# Patient Record
Sex: MALE | Race: WHITE | NOT HISPANIC OR LATINO | URBAN - METROPOLITAN AREA
[De-identification: names, ages, dates, MRNs, and addresses within clinical notes are randomized per-mention and may not be internally consistent; named-entity substitution may affect disease eponyms.]

---

## 2019-01-17 ENCOUNTER — IMPORTED ENCOUNTER (OUTPATIENT)
Dept: URBAN - METROPOLITAN AREA CLINIC 9 | Facility: CLINIC | Age: 79
End: 2019-01-17

## 2019-01-31 ENCOUNTER — IMPORTED ENCOUNTER (OUTPATIENT)
Dept: URBAN - METROPOLITAN AREA CLINIC 9 | Facility: CLINIC | Age: 79
End: 2019-01-31

## 2019-02-01 ENCOUNTER — IMPORTED ENCOUNTER (OUTPATIENT)
Dept: URBAN - METROPOLITAN AREA CLINIC 9 | Facility: CLINIC | Age: 79
End: 2019-02-01

## 2019-04-04 ENCOUNTER — IMPORTED ENCOUNTER (OUTPATIENT)
Dept: URBAN - METROPOLITAN AREA CLINIC 9 | Facility: CLINIC | Age: 79
End: 2019-04-04

## 2019-10-07 NOTE — PATIENT DISCUSSION
VITREOMACULAR TRACTION, OD:  TX OPTIONS DISCUSSED. PATIENT ELECTS TO CONTINUE TO OBSERVE. RETURN AS SCHEDULED.

## 2019-10-07 NOTE — PATIENT DISCUSSION
HTN RETINOPATHY: PARAPAPILLARY HEMORRHAGE AND A/V NICKING; EMPHASIZED IMPORTANCE OF GOOD BLOOD PRESSURE CONTROL TO MINIMIZE RISK OF VASCULAR COMPLICATIONS IN THE EYE.

## 2019-10-17 ENCOUNTER — IMPORTED ENCOUNTER (OUTPATIENT)
Dept: URBAN - METROPOLITAN AREA CLINIC 9 | Facility: CLINIC | Age: 79
End: 2019-10-17

## 2019-11-19 ENCOUNTER — IMPORTED ENCOUNTER (OUTPATIENT)
Dept: URBAN - METROPOLITAN AREA CLINIC 9 | Facility: CLINIC | Age: 79
End: 2019-11-19

## 2020-02-05 ENCOUNTER — IMPORTED ENCOUNTER (OUTPATIENT)
Dept: URBAN - METROPOLITAN AREA CLINIC 9 | Facility: CLINIC | Age: 80
End: 2020-02-05

## 2020-03-03 ENCOUNTER — IMPORTED ENCOUNTER (OUTPATIENT)
Dept: URBAN - METROPOLITAN AREA CLINIC 9 | Facility: CLINIC | Age: 80
End: 2020-03-03

## 2020-09-09 ENCOUNTER — IMPORTED ENCOUNTER (OUTPATIENT)
Dept: URBAN - METROPOLITAN AREA CLINIC 9 | Facility: CLINIC | Age: 80
End: 2020-09-09

## 2020-09-21 ENCOUNTER — IMPORTED ENCOUNTER (OUTPATIENT)
Dept: URBAN - METROPOLITAN AREA CLINIC 9 | Facility: CLINIC | Age: 80
End: 2020-09-21

## 2020-09-23 ENCOUNTER — IMPORTED ENCOUNTER (OUTPATIENT)
Dept: URBAN - METROPOLITAN AREA CLINIC 9 | Facility: CLINIC | Age: 80
End: 2020-09-23

## 2020-10-01 ENCOUNTER — IMPORTED ENCOUNTER (OUTPATIENT)
Dept: URBAN - METROPOLITAN AREA CLINIC 9 | Facility: CLINIC | Age: 80
End: 2020-10-01

## 2021-01-05 ENCOUNTER — IMPORTED ENCOUNTER (OUTPATIENT)
Dept: URBAN - METROPOLITAN AREA CLINIC 9 | Facility: CLINIC | Age: 81
End: 2021-01-05

## 2021-02-19 NOTE — PATIENT DISCUSSION
Recommend warm compresses and lid scrubs to help manage blepharitis. Use artificial tears 4-6 times a day for dry eyes.

## 2021-03-10 ENCOUNTER — IMPORTED ENCOUNTER (OUTPATIENT)
Dept: URBAN - METROPOLITAN AREA CLINIC 9 | Facility: CLINIC | Age: 81
End: 2021-03-10

## 2021-05-10 ENCOUNTER — IMPORTED ENCOUNTER (OUTPATIENT)
Dept: URBAN - METROPOLITAN AREA CLINIC 9 | Facility: CLINIC | Age: 81
End: 2021-05-10

## 2021-09-14 ENCOUNTER — IMPORTED ENCOUNTER (OUTPATIENT)
Dept: URBAN - METROPOLITAN AREA CLINIC 9 | Facility: CLINIC | Age: 81
End: 2021-09-14

## 2021-10-18 ASSESSMENT — VISUAL ACUITY
OD_CC: 20/25 +2 SN
OD_CC: 20/25 +2 SN
OD_SC: 20/40 -2 SN
OS_SC: 20/25 -2 SN
OS_CC: 20/20 -2 SN
OS_CC: 20/20 -2 SN
OS_SC: 20/30 -2 SN
OS_SC: 20/40 SN
OD_SC: 20/30 SN
OS_SC: 20/40 + SN
OD_SC: 20/25 -2 SN
OS_SC: 20/30 - SN
OD_SC: 20/40 -2 SN
OD_SC: 20/25 - SN
OD_SC: 20/25 SN
OS_PH: 20/40 + SN
OS_SC: 20/40 SN
OS_SC: 20/30 -2 SN
OS_SC: 20/40 SN
OS_SC: 20/50 + SN
OS_CC: 20/25 -2 SN
OD_SC: 20/25 - SN
OD_CC: 20/20 - SN
OD_SC: 20/40 + SN
OD_SC: 20/25 -2 SN
OD_SC: 20/40 -2 SN

## 2021-10-18 ASSESSMENT — TONOMETRY
OD_IOP_MMHG: 21
OS_IOP_MMHG: 18
OD_IOP_MMHG: 17
OD_IOP_MMHG: 18
OS_IOP_MMHG: 18
OD_IOP_MMHG: 18
OD_IOP_MMHG: 20
OD_IOP_MMHG: 20
OD_IOP_MMHG: 24
OD_IOP_MMHG: 14
OS_IOP_MMHG: 20
OD_IOP_MMHG: 20
OS_IOP_MMHG: 21
OS_IOP_MMHG: 19
OS_IOP_MMHG: 20
OD_IOP_MMHG: 21
OD_IOP_MMHG: 19
OS_IOP_MMHG: 20
OD_IOP_MMHG: 19
OS_IOP_MMHG: 16
OS_IOP_MMHG: 17
OS_IOP_MMHG: 20
OD_IOP_MMHG: 19
OD_IOP_MMHG: 20
OS_IOP_MMHG: 20
OS_IOP_MMHG: 21
OD_IOP_MMHG: 20
OS_IOP_MMHG: 14
OD_IOP_MMHG: 22
OS_IOP_MMHG: 19

## 2021-10-18 ASSESSMENT — KERATOMETRY
OS_K1POWER_DIOPTERS: 44.75
OS_AXISANGLE_DEGREES: 76
OS_AXISANGLE_DEGREES: 88
OS_AXISANGLE2_DEGREES: 178
OD_AXISANGLE2_DEGREES: 25
OD_AXISANGLE_DEGREES: 115
OS_AXISANGLE2_DEGREES: 166
OD_K1POWER_DIOPTERS: 44.5
OS_K1POWER_DIOPTERS: 44.625
OD_AXISANGLE2_DEGREES: 174
OD_K2POWER_DIOPTERS: 45
OD_K2POWER_DIOPTERS: 45.25
OS_K2POWER_DIOPTERS: 45.25
OD_AXISANGLE_DEGREES: 84
OD_K1POWER_DIOPTERS: 44.25
OS_K2POWER_DIOPTERS: 45.25

## 2022-01-18 ENCOUNTER — FOLLOW UP (OUTPATIENT)
Dept: URBAN - NONMETROPOLITAN AREA CLINIC 6 | Facility: CLINIC | Age: 82
End: 2022-01-18

## 2022-01-18 DIAGNOSIS — H40.1122: ICD-10-CM

## 2022-01-18 DIAGNOSIS — H40.1112: ICD-10-CM

## 2022-01-18 PROCEDURE — 99213 OFFICE O/P EST LOW 20 MIN: CPT

## 2022-01-18 ASSESSMENT — KERATOMETRY
OS_AXISANGLE_DEGREES: 76
OD_AXISANGLE2_DEGREES: 25
OS_K1POWER_DIOPTERS: 44.75
OS_AXISANGLE2_DEGREES: 166
OD_K2POWER_DIOPTERS: 45.25
OD_AXISANGLE_DEGREES: 115
OD_K1POWER_DIOPTERS: 44.25
OS_K2POWER_DIOPTERS: 45.25

## 2022-01-18 ASSESSMENT — TONOMETRY
OD_IOP_MMHG: 21
OS_IOP_MMHG: 22

## 2022-01-18 ASSESSMENT — VISUAL ACUITY
OD_SC: 20/30-1
OU_SC: 20/40
OS_SC: 20/40-1

## 2022-03-02 ENCOUNTER — DIAGNOSTICS ONLY (OUTPATIENT)
Dept: URBAN - NONMETROPOLITAN AREA CLINIC 6 | Facility: CLINIC | Age: 82
End: 2022-03-02

## 2022-03-02 DIAGNOSIS — H40.1112: ICD-10-CM

## 2022-03-02 DIAGNOSIS — H40.1122: ICD-10-CM

## 2022-03-02 PROCEDURE — 92083 EXTENDED VISUAL FIELD XM: CPT

## 2022-03-02 PROCEDURE — 92250 FUNDUS PHOTOGRAPHY W/I&R: CPT

## 2022-03-02 ASSESSMENT — KERATOMETRY
OD_AXISANGLE2_DEGREES: 25
OS_AXISANGLE_DEGREES: 76
OS_K2POWER_DIOPTERS: 45.25
OS_K1POWER_DIOPTERS: 44.75
OS_AXISANGLE2_DEGREES: 166
OD_AXISANGLE_DEGREES: 115
OD_K1POWER_DIOPTERS: 44.25
OD_K2POWER_DIOPTERS: 45.25

## 2022-05-18 ENCOUNTER — FOLLOW UP (OUTPATIENT)
Dept: URBAN - NONMETROPOLITAN AREA CLINIC 6 | Facility: CLINIC | Age: 82
End: 2022-05-18

## 2022-05-18 DIAGNOSIS — H04.123: ICD-10-CM

## 2022-05-18 DIAGNOSIS — H40.1132: ICD-10-CM

## 2022-05-18 DIAGNOSIS — Z96.1: ICD-10-CM

## 2022-05-18 PROCEDURE — 99213 OFFICE O/P EST LOW 20 MIN: CPT

## 2022-05-18 ASSESSMENT — VISUAL ACUITY
OD_SC: 20/40
OS_SC: 20/40

## 2022-05-18 ASSESSMENT — TONOMETRY
OS_IOP_MMHG: 18
OD_IOP_MMHG: 18

## 2022-09-13 ENCOUNTER — COMPREHENSIVE EXAM (OUTPATIENT)
Dept: URBAN - NONMETROPOLITAN AREA CLINIC 6 | Facility: CLINIC | Age: 82
End: 2022-09-13

## 2022-09-13 DIAGNOSIS — H40.1132: ICD-10-CM

## 2022-09-13 DIAGNOSIS — H04.123: ICD-10-CM

## 2022-09-13 DIAGNOSIS — H26.493: ICD-10-CM

## 2022-09-13 PROCEDURE — 92014 COMPRE OPH EXAM EST PT 1/>: CPT

## 2022-09-13 PROCEDURE — 92133 CPTRZD OPH DX IMG PST SGM ON: CPT

## 2022-09-13 RX ORDER — BIMATOPROST 0.1 MG/ML: 1 SOLUTION/ DROPS OPHTHALMIC EVERY EVENING

## 2022-09-13 ASSESSMENT — KERATOMETRY
OS_K2POWER_DIOPTERS: 45.25
OS_AXISANGLE_DEGREES: 83
OS_K1POWER_DIOPTERS: 44.50
OD_K2POWER_DIOPTERS: 45.50
OS_AXISANGLE2_DEGREES: 173
OD_AXISANGLE2_DEGREES: 17
OD_K1POWER_DIOPTERS: 44.25
OD_AXISANGLE_DEGREES: 107

## 2022-09-13 ASSESSMENT — VISUAL ACUITY
OU_CC: J1
OU_CC: 20/25
OD_GLARE: 20/50
OU_SC: 20/30
OS_CC: 20/30
OS_SC: 20/30-1
OD_CC: 20/25-2
OD_SC: 20/50-1
OS_GLARE: 20/40

## 2022-09-13 ASSESSMENT — TONOMETRY
OD_IOP_MMHG: 22
OS_IOP_MMHG: 21

## 2022-09-29 ENCOUNTER — FOLLOW UP (OUTPATIENT)
Dept: URBAN - NONMETROPOLITAN AREA CLINIC 6 | Facility: CLINIC | Age: 82
End: 2022-09-29

## 2022-09-29 DIAGNOSIS — H40.1132: ICD-10-CM

## 2022-09-29 DIAGNOSIS — H04.123: ICD-10-CM

## 2022-09-29 PROCEDURE — 99213 OFFICE O/P EST LOW 20 MIN: CPT

## 2022-09-29 ASSESSMENT — VISUAL ACUITY
OD_CC: 20/40
OS_CC: 20/30+1

## 2022-09-29 ASSESSMENT — TONOMETRY
OD_IOP_MMHG: 17
OS_IOP_MMHG: 17

## 2022-10-25 ENCOUNTER — FOLLOW UP (OUTPATIENT)
Dept: URBAN - NONMETROPOLITAN AREA CLINIC 6 | Facility: CLINIC | Age: 82
End: 2022-10-25

## 2022-10-25 DIAGNOSIS — H04.123: ICD-10-CM

## 2022-10-25 DIAGNOSIS — H40.1132: ICD-10-CM

## 2022-10-25 PROCEDURE — 99213 OFFICE O/P EST LOW 20 MIN: CPT

## 2022-10-25 ASSESSMENT — VISUAL ACUITY
OS_SC: 20/30
OU_SC: 20/30
OD_SC: 20/30

## 2022-10-25 ASSESSMENT — TONOMETRY
OS_IOP_MMHG: 18
OD_IOP_MMHG: 18

## 2023-06-05 ENCOUNTER — FOLLOW UP (OUTPATIENT)
Dept: URBAN - NONMETROPOLITAN AREA CLINIC 6 | Facility: CLINIC | Age: 83
End: 2023-06-05

## 2023-06-05 DIAGNOSIS — H16.223: ICD-10-CM

## 2023-06-05 DIAGNOSIS — H40.1132: ICD-10-CM

## 2023-06-05 DIAGNOSIS — H04.123: ICD-10-CM

## 2023-06-05 PROCEDURE — 92012 INTRM OPH EXAM EST PATIENT: CPT

## 2023-06-05 PROCEDURE — 68761 CLOSE TEAR DUCT OPENING: CPT

## 2023-06-05 ASSESSMENT — TONOMETRY
OS_IOP_MMHG: 14
OD_IOP_MMHG: 17

## 2023-06-05 ASSESSMENT — VISUAL ACUITY
OS_SC: 20/50
OD_SC: 20/200

## 2023-11-07 ENCOUNTER — COMPREHENSIVE EXAM (OUTPATIENT)
Dept: URBAN - NONMETROPOLITAN AREA CLINIC 6 | Facility: CLINIC | Age: 83
End: 2023-11-07

## 2023-11-07 DIAGNOSIS — H40.1132: ICD-10-CM

## 2023-11-07 DIAGNOSIS — H26.493: ICD-10-CM

## 2023-11-07 DIAGNOSIS — H04.123: ICD-10-CM

## 2023-11-07 PROCEDURE — 92014 COMPRE OPH EXAM EST PT 1/>: CPT

## 2023-11-07 PROCEDURE — 92133 CPTRZD OPH DX IMG PST SGM ON: CPT

## 2023-11-07 ASSESSMENT — VISUAL ACUITY
OS_GLARE: 20/40
OD_GLARE: 20/40
OS_SC: 20/25-1
OD_SC: 20/25-1
OU_SC: 20/25

## 2023-11-07 ASSESSMENT — TONOMETRY
OS_IOP_MMHG: 16
OD_IOP_MMHG: 20

## 2024-03-26 ENCOUNTER — FOLLOW UP (OUTPATIENT)
Dept: URBAN - NONMETROPOLITAN AREA CLINIC 6 | Facility: CLINIC | Age: 84
End: 2024-03-26

## 2024-03-26 DIAGNOSIS — H40.1132: ICD-10-CM

## 2024-03-26 PROCEDURE — 99213 OFFICE O/P EST LOW 20 MIN: CPT

## 2024-03-26 PROCEDURE — 92015 DETERMINE REFRACTIVE STATE: CPT

## 2024-03-26 ASSESSMENT — VISUAL ACUITY
OS_SC: 20/40
OU_SC: 20/30
OD_SC: 20/30

## 2024-03-26 ASSESSMENT — TONOMETRY
OS_IOP_MMHG: 16
OD_IOP_MMHG: 14

## 2024-05-01 ENCOUNTER — DIAGNOSTICS ONLY (OUTPATIENT)
Dept: URBAN - NONMETROPOLITAN AREA CLINIC 6 | Facility: CLINIC | Age: 84
End: 2024-05-01

## 2024-05-01 DIAGNOSIS — H40.1132: ICD-10-CM

## 2024-05-01 PROCEDURE — 92083 EXTENDED VISUAL FIELD XM: CPT

## 2024-05-01 PROCEDURE — 92250 FUNDUS PHOTOGRAPHY W/I&R: CPT

## 2024-05-01 ASSESSMENT — TONOMETRY
OS_IOP_MMHG: 16
OD_IOP_MMHG: 18

## 2024-07-16 ENCOUNTER — FOLLOW UP (OUTPATIENT)
Dept: URBAN - NONMETROPOLITAN AREA CLINIC 6 | Facility: CLINIC | Age: 84
End: 2024-07-16

## 2024-07-16 DIAGNOSIS — H40.1132: ICD-10-CM

## 2024-07-16 PROCEDURE — 65855 TRABECULOPLASTY LASER SURG: CPT

## 2024-07-16 PROCEDURE — 92012 INTRM OPH EXAM EST PATIENT: CPT | Mod: 57

## 2024-07-16 PROCEDURE — 92020 GONIOSCOPY: CPT

## 2024-07-16 RX ORDER — PREDNISOLONE ACETATE 10 MG/ML: 1 SUSPENSION/ DROPS OPHTHALMIC

## 2024-07-16 ASSESSMENT — TONOMETRY
OS_IOP_MMHG: 15
OS_IOP_MMHG: 21
OD_IOP_MMHG: 20

## 2024-07-16 ASSESSMENT — VISUAL ACUITY
OS_SC: 20/30-2
OU_SC: 20/25-1
OD_SC: 20/30-2

## 2024-07-25 ENCOUNTER — POST-OP (OUTPATIENT)
Dept: URBAN - NONMETROPOLITAN AREA CLINIC 6 | Facility: CLINIC | Age: 84
End: 2024-07-25

## 2024-07-25 DIAGNOSIS — H40.1132: ICD-10-CM

## 2024-07-25 PROCEDURE — 99024 POSTOP FOLLOW-UP VISIT: CPT

## 2024-07-25 ASSESSMENT — TONOMETRY
OD_IOP_MMHG: 20
OS_IOP_MMHG: 18

## 2024-07-25 ASSESSMENT — VISUAL ACUITY
OD_SC: 20/30+1
OS_SC: 20/30+1
OU_SC: 20/30+1

## 2024-08-12 ENCOUNTER — CLINIC PROCEDURE ONLY (OUTPATIENT)
Dept: URBAN - NONMETROPOLITAN AREA CLINIC 6 | Facility: CLINIC | Age: 84
End: 2024-08-12

## 2024-08-12 DIAGNOSIS — H40.1132: ICD-10-CM

## 2024-08-12 PROCEDURE — 65855 TRABECULOPLASTY LASER SURG: CPT

## 2024-08-12 RX ORDER — PREDNISOLONE SODIUM PHOSPHATE 10 MG/ML: 1 SOLUTION/ DROPS OPHTHALMIC

## 2024-08-12 ASSESSMENT — VISUAL ACUITY
OD_SC: 20/25-2
OU_SC: 20/25-1
OS_SC: 20/30+1

## 2024-08-12 ASSESSMENT — TONOMETRY
OD_IOP_MMHG: 13
OD_IOP_MMHG: 18
OS_IOP_MMHG: 20

## 2024-09-23 ENCOUNTER — FOLLOW UP (OUTPATIENT)
Dept: URBAN - NONMETROPOLITAN AREA CLINIC 6 | Facility: CLINIC | Age: 84
End: 2024-09-23

## 2024-09-23 DIAGNOSIS — Z98.890: ICD-10-CM

## 2024-09-23 DIAGNOSIS — H40.1132: ICD-10-CM

## 2024-09-23 PROCEDURE — 99213 OFFICE O/P EST LOW 20 MIN: CPT

## 2024-12-19 ENCOUNTER — COMPREHENSIVE EXAM (OUTPATIENT)
Age: 84
End: 2024-12-19

## 2024-12-19 DIAGNOSIS — H40.1132: ICD-10-CM

## 2024-12-19 DIAGNOSIS — H26.493: ICD-10-CM

## 2024-12-19 DIAGNOSIS — H04.123: ICD-10-CM

## 2024-12-19 PROCEDURE — 92014 COMPRE OPH EXAM EST PT 1/>: CPT

## 2024-12-19 PROCEDURE — 92134 CPTRZ OPH DX IMG PST SGM RTA: CPT | Mod: NC

## 2024-12-19 PROCEDURE — 92133 CPTRZD OPH DX IMG PST SGM ON: CPT

## 2025-04-28 ENCOUNTER — FOLLOW UP (OUTPATIENT)
Age: 85
End: 2025-04-28

## 2025-04-28 DIAGNOSIS — H40.1132: ICD-10-CM

## 2025-04-28 PROCEDURE — 99213 OFFICE O/P EST LOW 20 MIN: CPT

## 2025-05-28 ENCOUNTER — DIAGNOSTICS ONLY (OUTPATIENT)
Age: 85
End: 2025-05-28

## 2025-05-28 DIAGNOSIS — H40.1132: ICD-10-CM

## 2025-05-28 PROCEDURE — 92083 EXTENDED VISUAL FIELD XM: CPT

## 2025-08-12 ENCOUNTER — FOLLOW UP (OUTPATIENT)
Age: 85
End: 2025-08-12

## 2025-08-12 DIAGNOSIS — H40.1132: ICD-10-CM

## 2025-08-12 PROCEDURE — 99213 OFFICE O/P EST LOW 20 MIN: CPT
